# Patient Record
Sex: MALE | Race: WHITE | NOT HISPANIC OR LATINO
[De-identification: names, ages, dates, MRNs, and addresses within clinical notes are randomized per-mention and may not be internally consistent; named-entity substitution may affect disease eponyms.]

---

## 2018-08-25 ENCOUNTER — LAB SERVICES (OUTPATIENT)
Dept: OTHER | Age: 43
End: 2018-08-25

## 2018-08-25 ENCOUNTER — CHARTING TRANS (OUTPATIENT)
Dept: OTHER | Age: 43
End: 2018-08-25

## 2018-08-25 LAB — RAPID STREP GROUP A: NORMAL

## 2018-09-30 ENCOUNTER — CHARTING TRANS (OUTPATIENT)
Dept: OTHER | Age: 43
End: 2018-09-30

## 2018-12-08 VITALS
HEART RATE: 76 BPM | BODY MASS INDEX: 31.24 KG/M2 | HEIGHT: 78 IN | OXYGEN SATURATION: 98 % | SYSTOLIC BLOOD PRESSURE: 118 MMHG | DIASTOLIC BLOOD PRESSURE: 82 MMHG | WEIGHT: 270 LBS | RESPIRATION RATE: 16 BRPM | TEMPERATURE: 98.5 F

## 2019-10-08 ENCOUNTER — WALK IN (OUTPATIENT)
Dept: URGENT CARE | Age: 44
End: 2019-10-08

## 2019-10-08 DIAGNOSIS — Z23 ENCOUNTER FOR IMMUNIZATION: Primary | ICD-10-CM

## 2019-10-08 PROCEDURE — 90471 IMMUNIZATION ADMIN: CPT | Performed by: NURSE PRACTITIONER

## 2019-10-08 PROCEDURE — 90472 IMMUNIZATION ADMIN EACH ADD: CPT | Performed by: NURSE PRACTITIONER

## 2019-10-08 PROCEDURE — 90715 TDAP VACCINE 7 YRS/> IM: CPT | Performed by: NURSE PRACTITIONER

## 2019-10-08 PROCEDURE — 90686 IIV4 VACC NO PRSV 0.5 ML IM: CPT | Performed by: NURSE PRACTITIONER

## 2025-04-29 ENCOUNTER — APPOINTMENT (OUTPATIENT)
Dept: RADIOLOGY | Facility: CLINIC | Age: 50
End: 2025-04-29
Attending: FAMILY MEDICINE
Payer: COMMERCIAL

## 2025-04-29 ENCOUNTER — OFFICE VISIT (OUTPATIENT)
Dept: URGENT CARE | Facility: CLINIC | Age: 50
End: 2025-04-29
Payer: COMMERCIAL

## 2025-04-29 VITALS
HEIGHT: 77 IN | DIASTOLIC BLOOD PRESSURE: 82 MMHG | OXYGEN SATURATION: 97 % | TEMPERATURE: 99.2 F | SYSTOLIC BLOOD PRESSURE: 134 MMHG | WEIGHT: 268 LBS | RESPIRATION RATE: 20 BRPM | BODY MASS INDEX: 31.64 KG/M2 | HEART RATE: 75 BPM

## 2025-04-29 DIAGNOSIS — S61.432A PUNCTURE WOUND OF LEFT PALM, INITIAL ENCOUNTER: Primary | ICD-10-CM

## 2025-04-29 DIAGNOSIS — S61.432A PUNCTURE WOUND OF LEFT PALM, INITIAL ENCOUNTER: ICD-10-CM

## 2025-04-29 PROBLEM — Z87.81 HISTORY OF FRACTURE OF NASAL BONE: Status: RESOLVED | Noted: 2025-04-29 | Resolved: 2025-04-29

## 2025-04-29 PROBLEM — R59.0 POSTERIOR CERVICAL ADENOPATHY: Status: ACTIVE | Noted: 2025-04-29

## 2025-04-29 PROBLEM — L70.9 ACNE: Status: ACTIVE | Noted: 2025-04-29

## 2025-04-29 PROBLEM — S16.1XXA CERVICAL MYOFASCIAL STRAIN: Status: ACTIVE | Noted: 2025-04-29

## 2025-04-29 PROBLEM — M77.9 TENDONITIS: Status: ACTIVE | Noted: 2025-04-29

## 2025-04-29 PROBLEM — M54.10 RADICULOPATHY: Status: ACTIVE | Noted: 2025-04-29

## 2025-04-29 PROCEDURE — 99203 OFFICE O/P NEW LOW 30 MIN: CPT | Performed by: FAMILY MEDICINE

## 2025-04-29 PROCEDURE — 73130 X-RAY EXAM OF HAND: CPT

## 2025-04-29 RX ORDER — CEPHALEXIN 500 MG/1
500 CAPSULE ORAL EVERY 12 HOURS SCHEDULED
Qty: 14 CAPSULE | Refills: 0 | Status: SHIPPED | OUTPATIENT
Start: 2025-04-29 | End: 2025-05-06

## 2025-04-29 NOTE — PATIENT INSTRUCTIONS
Xray of the left hand shows no findings consistent with a foreign body and no acute osseous abnormalities.  Wound site cleaned and disinfected, irrigated w/ saline, scant amount of debris removed, no foreign body found on exam.   Bacitracin ointment w/ dressing applied. Patient is to gently wash the wound with soap and water daily, keep the area clean, continue to apply topical antibiotic ointment to the site and keep covered with a dressing until healed.   Rest the hand, apply ice to the site, and take Tylenol or Ibuprofen as needed for pain.   Keflex prescribed to help prevent wound site infection, complete as directed.   Tdap vaccine is up to date (June 2020)  Follow up w/ PCP office for re-check in 1 week.   Monitor for signs of infection (redness, swelling, increased pain, pus like discharge, fever), if any concern for infection, patient is to be seen in the ER.

## 2025-04-29 NOTE — PROGRESS NOTES
[facility]        NAME: Omar Meier is a 49 y.o. male  : 1975    MRN: [id number]  DATE: 2025  TIME: 5:38 PM    Assessment and Plan   Puncture wound of left palm, initial encounter [S61.432A]  1. Puncture wound of left palm, initial encounter  XR hand 3+ vw left    cephalexin (KEFLEX) 500 mg capsule        Patient Instructions     Patient Instructions   Xray of the left hand shows no findings consistent with a foreign body and no acute osseous abnormalities.  Wound site cleaned and disinfected, irrigated w/ saline, scant amount of debris removed, no foreign body found on exam.   Bacitracin ointment w/ dressing applied. Patient is to gently wash the wound with soap and water daily, keep the area clean, continue to apply topical antibiotic ointment to the site and keep covered with a dressing until healed.   Rest the hand, apply ice to the site, and take Tylenol or Ibuprofen as needed for pain.   Keflex prescribed to help prevent wound site infection, complete as directed.   Tdap vaccine is up to date (2020)  Follow up w/ PCP office for re-check in 1 week.   Monitor for signs of infection (redness, swelling, increased pain, pus like discharge, fever), if any concern for infection, patient is to be seen in the ER.    Follow up with PCP in 3-5 days.  Proceed to  ER if symptoms worsen.    If tests have been performed at [facility], our office will contact you with results if changes need to be made to the care plan discussed with you at the visit.  [facility]    Chief Complaint     Chief Complaint   Patient presents with    Hand Injury     Pt here for injury to left hand pt states he was screwing plastic and the drill went into his hand, today 30 mins ago. Last TD was 2020.     History of Present Illness     50 yo male presents for an injury of the L hand that occurred while at home today. Patient states he was using a drill to screw in a  plastic object when the plastic item shattered and he accidentally hit the palm of his left hand with the drill. He states when the plastic broke he thinks some small pieces of plastic got into the wound. He states he washed the wound under water and picked out a few pieces of plastic. He is unsure if there are is any residual plastic in the wound at this time. He is experiencing mild pain at the wound site but denies any foreign body sensation. No numbness/tingling or weakness of the hand. He is able to move the hand in full ROM without any pain or stiffness. His last Tdap vaccine was in June 2020. He has no known allergies. He took Ibuprofen prior to arrival at office and applied a band-aid to the wound.      Review of Systems   Review of Systems   Constitutional: Negative.    Respiratory: Negative.     Cardiovascular: Negative.    Musculoskeletal: Negative.    Skin:         As noted in HPI   Allergic/Immunologic: Negative.    Neurological: Negative.    Hematological: Negative.      Current Medications       Current Outpatient Medications:     cephalexin (KEFLEX) 500 mg capsule, Take 1 capsule (500 mg total) by mouth every 12 (twelve) hours for 7 days, Disp: 14 capsule, Rfl: 0    Current Allergies     Allergies as of 04/29/2025    (No Known Allergies)            The following portions of the patient's history were reviewed and updated as appropriate: allergies, current medications, past family history, past medical history, past social history, past surgical history and problem list.     Past Medical History:   Diagnosis Date    History of infectious mononucleosis 01/01/2003    Patient denies medical problems        Past Surgical History:   Procedure Laterality Date    APPENDECTOMY         Family History   Problem Relation Age of Onset    No Known Problems Mother     No Known Problems Father          Medications have been verified.        Objective   /82 (Patient Position: Sitting, Cuff Size: Large)   Pulse  "75   Temp 99.2 °F (37.3 °C) (Tympanic)   Resp 20   Ht 6' 5\" (1.956 m)   Wt 122 kg (268 lb)   SpO2 97%   BMI 31.78 kg/m²   No LMP for male patient.       Physical Exam     Physical Exam  Vitals and nursing note reviewed.   Constitutional:       General: He is awake. He is not in acute distress.     Appearance: Normal appearance. He is well-developed and well-groomed. He is not ill-appearing, toxic-appearing or diaphoretic.   Cardiovascular:      Rate and Rhythm: Normal rate.      Pulses: Normal pulses.   Pulmonary:      Effort: Pulmonary effort is normal. No tachypnea, accessory muscle usage or respiratory distress.   Musculoskeletal:      Comments: There is a small ~0.5 cm round puncture wound present on the palm of the left hand. There is no active bleeding from the wound site. The wound is mildly tender to touch. There are no visible or palpable foreign bodies. Scant amount of debris noted in the wound which was removed with irrigation and a saline soaked cotton swab. There is no swelling, bruising, or erythema of the surrounding soft tissue. Strength and sensations are intact. Hand has full ROM, no pain w/ movement.   Skin:     General: Skin is warm and dry.      Capillary Refill: Capillary refill takes less than 2 seconds.      Coloration: Skin is not pale.      Findings: Wound present. No abrasion, abscess, bruising, erythema or rash.   Neurological:      General: No focal deficit present.      Mental Status: He is alert and oriented to person, place, and time. Mental status is at baseline.      Sensory: Sensation is intact.      Motor: Motor function is intact.   Psychiatric:         Mood and Affect: Mood normal.         Behavior: Behavior normal. Behavior is cooperative.         Thought Content: Thought content normal.         Judgment: Judgment normal.       "